# Patient Record
Sex: FEMALE | URBAN - METROPOLITAN AREA
[De-identification: names, ages, dates, MRNs, and addresses within clinical notes are randomized per-mention and may not be internally consistent; named-entity substitution may affect disease eponyms.]

---

## 2024-06-14 ENCOUNTER — APPOINTMENT (OUTPATIENT)
Dept: URBAN - METROPOLITAN AREA CLINIC 225 | Age: 34
Setting detail: DERMATOLOGY
End: 2024-06-14

## 2024-06-14 DIAGNOSIS — L72.8 OTHER FOLLICULAR CYSTS OF THE SKIN AND SUBCUTANEOUS TISSUE: ICD-10-CM

## 2024-06-14 PROCEDURE — OTHER COUNSELING: OTHER

## 2024-06-14 PROCEDURE — OTHER MIPS QUALITY: OTHER

## 2024-06-14 PROCEDURE — OTHER INTRALESIONAL KENALOG: OTHER

## 2024-06-14 PROCEDURE — 11900 INJECT SKIN LESIONS </W 7: CPT

## 2024-06-14 PROCEDURE — OTHER PRESCRIPTION: OTHER

## 2024-06-14 PROCEDURE — OTHER PRESCRIPTION MEDICATION MANAGEMENT: OTHER

## 2024-06-14 RX ORDER — DOXYCYCLINE 100 MG/1
CAPSULE ORAL BID
Qty: 28 | Refills: 0 | Status: ERX | COMMUNITY
Start: 2024-06-14

## 2024-06-14 ASSESSMENT — LOCATION ZONE DERM: LOCATION ZONE: LEG

## 2024-06-14 ASSESSMENT — LOCATION DETAILED DESCRIPTION DERM
LOCATION DETAILED: RIGHT ANTERIOR MEDIAL PROXIMAL THIGH
LOCATION DETAILED: RIGHT ANTERIOR PROXIMAL THIGH

## 2024-06-14 ASSESSMENT — LOCATION SIMPLE DESCRIPTION DERM: LOCATION SIMPLE: RIGHT THIGH

## 2024-06-14 NOTE — HPI: SKIN LESION
What Type Of Note Output Would You Prefer (Optional)?: Standard Output
How Severe Is Your Skin Lesion?: moderate
Is This A New Presentation, Or A Follow-Up?: Skin Lesion
Additional History: Saw PCP Monday, was Doxycycline 100 mg twice a day. Yesterday got a shot of Rocephin. Getting better but wants it checked.\\n\\nHas lymphedema in right leg.
Which Family Member (Optional)?: Father

## 2024-06-14 NOTE — PROCEDURE: INTRALESIONAL KENALOG
How Many Mls Were Removed From The 40 Mg/Ml (10ml) Vial When Preparing The Injectable Solution?: 0
Bill For Wasted Drug (Kenalog)?: no
Kenalog Preparation: Kenalog
Total Volume (Ccs): 1
Validate Note Data When Using Inventory: Yes
Medical Necessity Clause: This procedure was medically necessary because the lesions that were treated were:
Administered By (Optional): Tiff Harding NP
Detail Level: Detailed
Lot # For Kenalog (Optional): [id number]
Expiration Date For Kenalog (Optional): Jan2026
Kenalog Type Of Vial: Multiple Dose
Consent: The risks of atrophy were reviewed with the patient.
Concentration Of Kenalog Solution Injected (Mg/Ml): 20.0
Show Inventory Tab: Hide
Ndc# For Kenalog Only: 1210-5015-21

## 2024-06-14 NOTE — PROCEDURE: PRESCRIPTION MEDICATION MANAGEMENT
Plan: No punctum seen.\\n\\nNo history of boils.
Detail Level: Zone
Render In Strict Bullet Format?: No
Continue Regimen: Finish current dose of Doxycycline then take another two weeks twice a day.

## 2024-06-17 ENCOUNTER — APPOINTMENT (OUTPATIENT)
Dept: URBAN - METROPOLITAN AREA CLINIC 225 | Age: 34
Setting detail: DERMATOLOGY
End: 2024-06-17

## 2024-06-17 DIAGNOSIS — L72.8 OTHER FOLLICULAR CYSTS OF THE SKIN AND SUBCUTANEOUS TISSUE: ICD-10-CM

## 2024-06-17 DIAGNOSIS — L71.0 PERIORAL DERMATITIS: ICD-10-CM

## 2024-06-17 PROCEDURE — OTHER INTRALESIONAL KENALOG: OTHER

## 2024-06-17 PROCEDURE — OTHER PRESCRIPTION MEDICATION MANAGEMENT: OTHER

## 2024-06-17 PROCEDURE — 99213 OFFICE O/P EST LOW 20 MIN: CPT | Mod: 25

## 2024-06-17 PROCEDURE — OTHER PRESCRIPTION: OTHER

## 2024-06-17 PROCEDURE — OTHER MIPS QUALITY: OTHER

## 2024-06-17 PROCEDURE — 11900 INJECT SKIN LESIONS </W 7: CPT

## 2024-06-17 PROCEDURE — OTHER COUNSELING: OTHER

## 2024-06-17 RX ORDER — DOXYCYCLINE 100 MG/1
CAPSULE ORAL BID
Qty: 60 | Refills: 2 | Status: ERX

## 2024-06-17 ASSESSMENT — LOCATION DETAILED DESCRIPTION DERM
LOCATION DETAILED: RIGHT ANTERIOR PROXIMAL THIGH
LOCATION DETAILED: RIGHT ANTERIOR MEDIAL PROXIMAL THIGH
LOCATION DETAILED: LEFT NASOLABIAL FOLD

## 2024-06-17 ASSESSMENT — LOCATION ZONE DERM
LOCATION ZONE: LIP
LOCATION ZONE: LEG

## 2024-06-17 ASSESSMENT — LOCATION SIMPLE DESCRIPTION DERM
LOCATION SIMPLE: RIGHT THIGH
LOCATION SIMPLE: LEFT LIP

## 2024-06-17 NOTE — PROCEDURE: INTRALESIONAL KENALOG
How Many Mls Were Removed From The 40 Mg/Ml (10ml) Vial When Preparing The Injectable Solution?: 0
Bill For Wasted Drug (Kenalog)?: no
Kenalog Preparation: Kenalog
Total Volume (Ccs): 1
Treatment Number (Optional): 2
Validate Note Data When Using Inventory: Yes
Medical Necessity Clause: This procedure was medically necessary because the lesions that were treated were:
Administered By (Optional): Tiff Harding NP
Detail Level: Detailed
Lot # For Kenalog (Optional): [id number]
Expiration Date For Kenalog (Optional): Jan2026
Kenalog Type Of Vial: Multiple Dose
Consent: The risks of atrophy were reviewed with the patient.
Concentration Of Kenalog Solution Injected (Mg/Ml): 40.0
Show Inventory Tab: Hide
Ndc# For Kenalog Only: 1303-2049-26

## 2024-06-17 NOTE — PROCEDURE: PRESCRIPTION MEDICATION MANAGEMENT
Plan: No punctum seen.\\n\\nNo history of boils.\\n\\nF/U 2 weeks if no improvement.
Detail Level: Zone
Render In Strict Bullet Format?: No
Continue Regimen: Doxycycline 100mg PO BID
Initiate Treatment: doxycycline monohydrate 100 mg capsule BID\\nQuantity: 60.0 Capsule  Days Supply: 30\\nSig: Take 1 capsule bid take with a meal and full glass of water

## 2024-07-09 ENCOUNTER — APPOINTMENT (OUTPATIENT)
Dept: URBAN - METROPOLITAN AREA CLINIC 225 | Age: 34
Setting detail: DERMATOLOGY
End: 2024-07-09

## 2024-07-09 DIAGNOSIS — L72.8 OTHER FOLLICULAR CYSTS OF THE SKIN AND SUBCUTANEOUS TISSUE: ICD-10-CM

## 2024-07-09 PROCEDURE — OTHER INTRALESIONAL KENALOG: OTHER

## 2024-07-09 PROCEDURE — 11900 INJECT SKIN LESIONS </W 7: CPT

## 2024-07-09 PROCEDURE — OTHER COUNSELING: OTHER

## 2024-07-09 PROCEDURE — OTHER MIPS QUALITY: OTHER

## 2024-07-09 ASSESSMENT — LOCATION DETAILED DESCRIPTION DERM: LOCATION DETAILED: RIGHT ANTERIOR MEDIAL PROXIMAL THIGH

## 2024-07-09 ASSESSMENT — LOCATION SIMPLE DESCRIPTION DERM: LOCATION SIMPLE: RIGHT THIGH

## 2024-07-09 ASSESSMENT — LOCATION ZONE DERM: LOCATION ZONE: LEG

## 2024-07-09 NOTE — PROCEDURE: INTRALESIONAL KENALOG
Require Ndc Code?: No
How Many Mls Were Removed From The 10 Mg/Ml (5ml) Vial When Preparing The Injectable Solution?: 0
Expiration Date For Kenalog (Optional): 01/202
Show Inventory Tab: Hide
Concentration Of Kenalog Solution Injected (Mg/Ml): 40.0
Ndc# For Kenalog Only: 5288-5892-67
Validate Note Data When Using Inventory: Yes
Kenalog Preparation: Kenalog
Medical Necessity Clause: This procedure was medically necessary because the lesions that were treated were:
Total Volume (Ccs): 1
Administered By (Optional): Tiff Harding. NP
Detail Level: Detailed
Lot # For Kenalog (Optional): [id number]
Kenalog Type Of Vial: Multiple Dose
Consent: The risks of atrophy were reviewed with the patient.

## 2024-07-09 NOTE — PROCEDURE: COUNSELING
Patient Specific Counseling (Will Not Stick From Patient To Patient): Discussed with patient that this may need excision and is large and deep and recommended seeing a General Surgeon for this
Detail Level: Detailed

## 2024-07-15 ENCOUNTER — APPOINTMENT (OUTPATIENT)
Dept: URBAN - METROPOLITAN AREA CLINIC 225 | Age: 34
Setting detail: DERMATOLOGY
End: 2024-07-15

## 2024-07-15 DIAGNOSIS — L72.8 OTHER FOLLICULAR CYSTS OF THE SKIN AND SUBCUTANEOUS TISSUE: ICD-10-CM

## 2024-07-15 PROCEDURE — OTHER MIPS QUALITY: OTHER

## 2024-07-15 PROCEDURE — OTHER COUNSELING: OTHER

## 2024-07-15 PROCEDURE — OTHER PRESCRIPTION MEDICATION MANAGEMENT: OTHER

## 2024-07-15 PROCEDURE — 99212 OFFICE O/P EST SF 10 MIN: CPT

## 2024-07-15 ASSESSMENT — LOCATION ZONE DERM: LOCATION ZONE: LEG

## 2024-07-15 ASSESSMENT — LOCATION DETAILED DESCRIPTION DERM: LOCATION DETAILED: RIGHT ANTERIOR MEDIAL PROXIMAL THIGH

## 2024-07-15 ASSESSMENT — LOCATION SIMPLE DESCRIPTION DERM: LOCATION SIMPLE: RIGHT THIGH

## 2024-07-15 NOTE — PROCEDURE: PRESCRIPTION MEDICATION MANAGEMENT
Detail Level: Zone
Plan: Seeing Plastic surgeon tomorrow. \\n\\Julisa had a shot of Rocephin yesterday.
Continue Regimen: Doxycycline until tomorrow.
Render In Strict Bullet Format?: No